# Patient Record
(demographics unavailable — no encounter records)

---

## 2024-11-18 NOTE — HISTORY OF PRESENT ILLNESS
[de-identified] : cough has not improved. congestion. no fevers. no v/d.  [FreeTextEntry6] : cough not better. Doesn't feel better. Afebrile. Eating/drinking/elimination normal.

## 2024-11-18 NOTE — DISCUSSION/SUMMARY
[FreeTextEntry1] : 7y F seen for acute visit. Azithromycin for acute sinusitis. Covers for atypical pneumonia even though no focal sounds on lung exam today and pt is afebrile. Supportive care. RTO PRN persistent, worsening, or new concerning symptoms.

## 2024-11-18 NOTE — HISTORY OF PRESENT ILLNESS
[de-identified] : cough has not improved. congestion. no fevers. no v/d.  [FreeTextEntry6] : cough not better. Doesn't feel better. Afebrile. Eating/drinking/elimination normal.

## 2025-01-29 NOTE — DISCUSSION/SUMMARY
[FreeTextEntry1] : Supportive care for viral illness includes increasing hydration, steam from shower, saline spray to nostrils for congestion, warm saltwater gargles. Tylenol or ibuprofen as needed for fever or pain.  Return as needed for new or worsening symptoms.

## 2025-01-29 NOTE — HISTORY OF PRESENT ILLNESS
[de-identified] : cough and congestion x 4 days. Mother states allergy and cough medication OTC not helping. headache 1x 2 days ago. no v/d. no fevers. eating and drinking fluids well. [FreeTextEntry6] : Cough and congestion and intermittent headaches x 3-4 days. Allergy and OTC cold medicine not allevieating symptoms. She is afebrile. Eating and drinking well,